# Patient Record
Sex: MALE | Race: WHITE | Employment: FULL TIME | URBAN - METROPOLITAN AREA
[De-identification: names, ages, dates, MRNs, and addresses within clinical notes are randomized per-mention and may not be internally consistent; named-entity substitution may affect disease eponyms.]

---

## 2017-10-11 ENCOUNTER — HOSPITAL ENCOUNTER (EMERGENCY)
Facility: CLINIC | Age: 42
Discharge: HOME | End: 2017-10-11
Attending: EMERGENCY MEDICINE | Admitting: EMERGENCY MEDICINE
Payer: OTHER MISCELLANEOUS

## 2017-10-11 VITALS
OXYGEN SATURATION: 96 % | SYSTOLIC BLOOD PRESSURE: 163 MMHG | TEMPERATURE: 97.8 F | DIASTOLIC BLOOD PRESSURE: 105 MMHG | RESPIRATION RATE: 16 BRPM

## 2017-10-11 DIAGNOSIS — T15.02XA FOREIGN BODY OF LEFT CORNEA, INITIAL ENCOUNTER: ICD-10-CM

## 2017-10-11 PROCEDURE — 99283 EMERGENCY DEPT VISIT LOW MDM: CPT | Performed by: EMERGENCY MEDICINE

## 2017-10-11 PROCEDURE — 25000125 ZZHC RX 250: Performed by: EMERGENCY MEDICINE

## 2017-10-11 PROCEDURE — 99284 EMERGENCY DEPT VISIT MOD MDM: CPT | Mod: Z6 | Performed by: EMERGENCY MEDICINE

## 2017-10-11 RX ORDER — POLYMYXIN B SULFATE AND TRIMETHOPRIM 1; 10000 MG/ML; [USP'U]/ML
1 SOLUTION OPHTHALMIC
Qty: 1 BOTTLE | Refills: 0 | Status: SHIPPED | OUTPATIENT
Start: 2017-10-11 | End: 2017-10-18

## 2017-10-11 RX ORDER — TETRACAINE HYDROCHLORIDE 5 MG/ML
2 SOLUTION OPHTHALMIC ONCE
Status: COMPLETED | OUTPATIENT
Start: 2017-10-11 | End: 2017-10-11

## 2017-10-11 RX ADMIN — TETRACAINE HYDROCHLORIDE 2 DROP: 5 SOLUTION OPHTHALMIC at 21:50

## 2017-10-11 ASSESSMENT — VISUAL ACUITY
OD: 20/15
OS: 20/50

## 2017-10-11 NOTE — ED AVS SNAPSHOT
[facility] Emergency Department    [address] [address]    Phone:  [phone]    Fax:  [phone]                                       Adalberto Ramon   MRN: [id number]    Department:  [facility] Emergency Department   Date of Visit:  10/11/2017           Patient Information     Date Of Birth          1975        Your diagnoses for this visit were:     Foreign body of left cornea, initial encounter        You were seen by Dav Cheek MD.        Discharge Instructions       Return to the emergency department if you have decreased vision, increased pain, or other concerns.  Otherwise follow-up in ophthalmology clinic in 2 days for a recheck.    24 Hour Appointment Hotline       To make an appointment at any [facility], call [phone] ([phone]). If you don't have a family doctor or clinic, we will help you find one. [place] clinics are conveniently located to serve the needs of you and your family.             Review of your medicines      START taking        Dose / Directions Last dose taken    trimethoprim-polymyxin b ophthalmic solution   Commonly known as:  POLYTRIM   Dose:  1 drop   Quantity:  1 Bottle        Apply 1 drop to eye every 3 hours for 7 days   Refills:  0          Our records show that you are taking the medicines listed below. If these are incorrect, please call your family doctor or clinic.        Dose / Directions Last dose taken    ADVIL 200 MG tablet   Dose:  200 mg   Generic drug:  ibuprofen        Take 200 mg by mouth every 4 hours as needed. 2 tablets as needed   Refills:  0        Capsaicin 0.1 % cream   Dose:  1 dose.   Quantity:  30 g        Externally apply 1 dose topically 4 times daily Apply sparingly using rubber gloves.   Refills:  11        cyclobenzaprine 10 MG tablet   Commonly known as:  FLEXERIL   Dose:  10 mg   Quantity:  30 tablet        Take 1 tablet (10 mg) by mouth At Bedtime   Refills:  5        TUMS 500  "MG chewable tablet   Dose:  1 chew tab   Generic drug:  calcium carbonate        Take 1 chew tab by mouth daily.   Refills:  0                Prescriptions were sent or printed at these locations (1 Prescription)                   Other Prescriptions                Printed at Department/Unit printer (1 of 1)         trimethoprim-polymyxin b (POLYTRIM) ophthalmic solution                Procedures and tests performed during your visit     Visual Acuity      Orders Needing Specimen Collection     None      Pending Results     No orders found from 10/9/2017 to 10/12/2017.            Pending Culture Results     No orders found from 10/9/2017 to 10/12/2017.            Pending Results Instructions     If you had any lab results that were not finalized at the time of your Discharge, you can call the ED Lab Result RN at [phone]. You will be contacted by this team for any positive Lab results or changes in treatment. The nurses are available 7 days a week from 10A to 6:30P.  You can leave a message 24 hours per day and they will return your call.        Test Results From Your Hospital Stay               Thank you for choosing [facility]       Thank you for choosing [facility] for your care. Our goal is always to provide you with excellent care. Hearing back from our patients is one way we can continue to improve our services. Please take a few minutes to complete the written survey that you may receive in the mail after you visit with us. Thank you!        [employer] Information     [employer] lets you send messages to your doctor, view your test results, renew your prescriptions, schedule appointments and more. To sign up, go to [email or web address] . Click on \"Log in\" on the left side of the screen, which will take you to the Welcome page. Then click on \"Sign up Now\" on the right side of the page.     You will be asked to enter the access code listed below, as well as some personal information. Please follow the directions " to create your username and password.     Your access code is: [email or web address]  Expires: 2018 10:17 PM     Your access code will  in 90 days. If you need help or a new code, please call your [facility] clinic or [phone].        Care EveryWhere ID     This is your Care EveryWhere ID. This could be used by other organizations to access your [facility] medical records  [id number]        Equal Access to Services     ELMER CLAY : Hadii viv solomon hadasho Soomaali, waaxda luqadaha, qaybta kaalmada adeegyada, dariel thacker . So [facility] [phone].    ATENCIÓN: Si habla español, tiene a mejía disposición servicios gratuitos de asistencia lingüística. Llame al [phone].    We comply with applicable federal civil rights laws and [place] laws. We do not discriminate on the basis of race, color, national origin, age, disability, sex, sexual orientation, or gender identity.            After Visit Summary       This is your record. Keep this with you and show to your community pharmacist(s) and doctor(s) at your next visit.

## 2017-10-11 NOTE — ED AVS SNAPSHOT
[facility] Emergency Department    [address] [address]    Phone:  [phone]    Fax:  [phone]                                       Adalberto Ramon   MRN: [id number]    Department:  [facility] Emergency Department   Date of Visit:  10/11/2017           After Visit Summary Signature Page     I have received my discharge instructions, and my questions have been answered. I have discussed any challenges I see with this plan with the nurse or doctor.    ..........................................................................................................................................  Patient/Patient Representative Signature      ..........................................................................................................................................  Patient Representative Print Name and Relationship to Patient    ..................................................               ................................................  Date                                            Time    ..........................................................................................................................................  Reviewed by Signature/Title    ...................................................              ..............................................  Date                                                            Time

## 2017-10-12 ENCOUNTER — HOSPITAL ENCOUNTER (EMERGENCY)
Facility: CLINIC | Age: 42
Discharge: HOME | End: 2017-10-12
Attending: EMERGENCY MEDICINE | Admitting: EMERGENCY MEDICINE
Payer: OTHER MISCELLANEOUS

## 2017-10-12 VITALS
TEMPERATURE: 98 F | SYSTOLIC BLOOD PRESSURE: 146 MMHG | BODY MASS INDEX: 33.61 KG/M2 | DIASTOLIC BLOOD PRESSURE: 107 MMHG | RESPIRATION RATE: 18 BRPM | OXYGEN SATURATION: 95 % | WEIGHT: 241 LBS

## 2017-10-12 DIAGNOSIS — T15.02XA FOREIGN BODY OF LEFT CORNEA, INITIAL ENCOUNTER: ICD-10-CM

## 2017-10-12 PROCEDURE — 99283 EMERGENCY DEPT VISIT LOW MDM: CPT | Mod: 25 | Performed by: EMERGENCY MEDICINE

## 2017-10-12 PROCEDURE — 65222 REMOVE FOREIGN BODY FROM EYE: CPT | Performed by: EMERGENCY MEDICINE

## 2017-10-12 PROCEDURE — 65222 REMOVE FOREIGN BODY FROM EYE: CPT | Mod: Z6 | Performed by: EMERGENCY MEDICINE

## 2017-10-12 PROCEDURE — 25000132 ZZH RX MED GY IP 250 OP 250 PS 637: Performed by: EMERGENCY MEDICINE

## 2017-10-12 RX ORDER — TETRACAINE HYDROCHLORIDE 5 MG/ML
2 SOLUTION OPHTHALMIC ONCE
Status: DISCONTINUED | OUTPATIENT
Start: 2017-10-12 | End: 2017-10-12 | Stop reason: HOSPADM

## 2017-10-12 RX ORDER — IBUPROFEN 400 MG/1
800 TABLET, FILM COATED ORAL ONCE
Status: COMPLETED | OUTPATIENT
Start: 2017-10-12 | End: 2017-10-12

## 2017-10-12 RX ORDER — IBUPROFEN 200 MG
800 TABLET ORAL EVERY 8 HOURS PRN
Qty: 30 TABLET | Refills: 0 | COMMUNITY
Start: 2017-10-12 | End: 2020-09-19

## 2017-10-12 RX ADMIN — IBUPROFEN 800 MG: 400 TABLET ORAL at 05:42

## 2017-10-12 ASSESSMENT — ENCOUNTER SYMPTOMS
FEVER: 0
NAUSEA: 0
EYE PAIN: 1
FATIGUE: 0
CHEST TIGHTNESS: 0

## 2017-10-12 ASSESSMENT — PAIN DESCRIPTION - DESCRIPTORS: DESCRIPTORS: ACHING

## 2017-10-12 NOTE — ED PROVIDER NOTES
History   Left eye irritation    HPI  Adalberto Ramon is a 42 year old male who presents for foreign body sensation of the left eye.  Symptoms started earlier today when he was working.  He works as a  and was underneath a car when he felt like a piece of rust fell into his eye.  He has had this happen many times before.  No visual changes.  No discharge from the eye.  No fever no headache.    Previously healthy  No daily medications  No known drug allergies  Does not smoke  Tetanus is up-to-date per the patient.    I have reviewed the Medications, Allergies, Past Medical and Surgical History, and Social History in the Epic system.         Review of Systems  A 4 point review of systems was performed. All pertinent positives and negatives were listed in the HPI and rest of ROS were otherwise negative.    Physical Exam   BP: (!) 163/105  Heart Rate: 67  Temp: 97.8  F (36.6  C)  Resp: 16  SpO2: 96 %  Visual Acuity-Left: 20/50  Visual Acuity-Right: 20/15    Physical Exam   Constitutional: He is oriented to person, place, and time. He appears well-developed and well-nourished. No distress.   HENT:   Head: Normocephalic and atraumatic.   Eyes: EOM are normal. Pupils are equal, round, and reactive to light. Lids are everted and swept, no foreign bodies found. Left conjunctiva is injected. No scleral icterus.   Slit lamp exam:       The right eye shows fluorescein uptake.        The left eye shows foreign body. The left eye shows no corneal abrasion, no corneal flare, no hyphema and no hypopyon.   Negative Aurelia sign   Neck: Normal range of motion. Neck supple.   Neurological: He is alert and oriented to person, place, and time.   Skin: Skin is warm and dry. No rash noted. He is not diaphoretic. No erythema. No pallor.       ED Course     ED Course     FB removal  Date/Time: 10/11/2017 10:23 PM  Performed by: JOHN CORTES  Authorized by: JOHN CORTES   Consent: Verbal consent  obtained.  Consent given by: patient  Patient identity confirmed: verbally with patient  Body area: eye  Location details: left cornea    Anesthesia:  Local Anesthetic: tetracaine drops    Sedation:  Patient sedated: no  Patient restrained: no  Patient cooperative: yes  Localization method: slit lamp and visualized  Removal mechanism: eye spud and irrigation  Eye examined with fluorescein.  Fluorescein uptake.  Corneal abrasion size: small  Corneal abrasion location: medial  No residual rust ring present.  Dressing: antibiotic drops  Depth: superficial  Complexity: simple  1 objects recovered.  Post-procedure assessment: foreign body removed  Patient tolerance: Patient tolerated the procedure well with no immediate complications                   Critical Care time:  none               Labs Ordered and Resulted from Time of ED Arrival Up to the Time of Departure from the ED - No data to display    Assessments & Plan (with Medical Decision Making)   41 yo M with left eye irritation. Differential includes conjunctivitis, superficial punctate keratitis, corneal ulcer, herpes, conjunctival abrasion, corneal foreign body. Symptoms improved with tetracaine.  Foreign body seen on exam and removed as above.  No sign of globe rupture.  The eye was irrigated and the patient was discharged with instructions to return if decreased vision or other concerns, otherwise follow up in ophthalmology clinic.  He was discharged with a prescription for Polytrim for antibiotic for plexus.  He does not wear contacts. The patient is in agreement with this plan.    I have reviewed the nursing notes.    I have reviewed the findings, diagnosis, plan and need for follow up with the patient.       New Prescriptions    TRIMETHOPRIM-POLYMYXIN B (POLYTRIM) OPHTHALMIC SOLUTION    Apply 1 drop to eye every 3 hours for 7 days       Final diagnoses:   Foreign body of left cornea, initial encounter       10/11/2017   [facility]  DEPARTMENT     Dav Cheek MD  10/11/17 1410

## 2017-10-12 NOTE — ED AVS SNAPSHOT
[facility] Emergency Department    [address] [address]    Phone:  [phone]    Fax:  [phone]                                       Adalberto Ramon   MRN: [id number]    Department:  [facility] Emergency Department   Date of Visit:  10/12/2017           After Visit Summary Signature Page     I have received my discharge instructions, and my questions have been answered. I have discussed any challenges I see with this plan with the nurse or doctor.    ..........................................................................................................................................  Patient/Patient Representative Signature      ..........................................................................................................................................  Patient Representative Print Name and Relationship to Patient    ..................................................               ................................................  Date                                            Time    ..........................................................................................................................................  Reviewed by Signature/Title    ...................................................              ..............................................  Date                                                            Time

## 2017-10-12 NOTE — DISCHARGE INSTRUCTIONS
"  Particle Removed from Eye (Corneal Foreign Body)    A particle got into your eye and stuck to the cornea (the clear part in the front of the eye). Your healthcare provider has removed this particle. The cornea is very sensitive and may still hurt for another 1 to 2 days while it heals.  If a metal particle was in your eye, a \"rust ring\" may have formed. This may require a second visit for complete removal.  Your healthcare provider may have put an eye patch on your eye. This may help the healing process. But you also may not receive an eye patch. For several types of injuries to the cornea, they are not recommended.  Home care    You may wear sunglasses to help reduce symptoms while the eye is healing, unless advised otherwise by your healthcare provider.    You may use acetaminophen or ibuprofen to control pain, unless another pain medicine was prescribed. (Note: If you have chronic liver or kidney disease, or if you have ever had a stomach ulcer or gastrointestinal bleeding, talk with your healthcare provider before using these medicines.)    If you received an eye patch:    It should not be left in place for more than 24 hours, unless advised otherwise by your healthcare provider.    Always keep your return appointment for patch removal and re-exam. Your eye could be harmed if the patch remains in place longer than advised.    Do not drive a motor vehicle or operate machinery with the patch in place. You will have trouble judging distances with only one eye.    If eye drops or ointment were prescribed, use as directed.  Follow-up care    No eye patch: If no patch was used, but the pain continues for more than 48 hours, you should have another eye exam.    Eye patch: If your eye was patched and you were given a return appointment for patch removal and re-exam, do not miss the visit. Again, it could be harmful to your eye if the patch remains in place longer than advised. However, if you were asked to remove the " eye patch within 24 hours (or as directed by your healthcare provider), contact your healthcare provider right away if your pain increases or get worse after removal of the eye patch.  When to seek medical advice  Call your healthcare provider right away if any of these occur:    Increasing eye pain or pain that does not improve after 24 hours    Discharge from the eye    Redness of the eye or swelling of the eyelids    Worsening vision  Date Last Reviewed: 2/24/2017 2000-2017 [employer]. [address] [place] [address]. All rights reserved. This information is not intended as a substitute for professional medical care. Always follow your healthcare professional's instructions.

## 2017-10-12 NOTE — DISCHARGE INSTRUCTIONS
Return to the emergency department if you have decreased vision, increased pain, or other concerns.  Otherwise follow-up in ophthalmology clinic in 2 days for a recheck.

## 2017-10-12 NOTE — ED AVS SNAPSHOT
" [facility] Emergency Department    [address] [address]    Phone:  [phone]    Fax:  [phone]                                       Adalberto Ramon   MRN: [id number]    Department:  [facility] Emergency Department   Date of Visit:  10/12/2017           Patient Information     Date Of Birth          1975        Your diagnoses for this visit were:     Foreign body of left cornea, initial encounter Removed       You were seen by Jose R Yun MD.      Follow-up Information     Schedule an appointment as soon as possible for a visit with [facility].    Why:  As needed if eye problems continue    Contact information:    [address] [facility] [address] [phone]        Discharge Instructions         Particle Removed from Eye (Corneal Foreign Body)    A particle got into your eye and stuck to the cornea (the clear part in the front of the eye). Your healthcare provider has removed this particle. The cornea is very sensitive and may still hurt for another 1 to 2 days while it heals.  If a metal particle was in your eye, a \"rust ring\" may have formed. This may require a second visit for complete removal.  Your healthcare provider may have put an eye patch on your eye. This may help the healing process. But you also may not receive an eye patch. For several types of injuries to the cornea, they are not recommended.  Home care    You may wear sunglasses to help reduce symptoms while the eye is healing, unless advised otherwise by your healthcare provider.    You may use acetaminophen or ibuprofen to control pain, unless another pain medicine was prescribed. (Note: If you have chronic liver or kidney disease, or if you have ever had a stomach ulcer or gastrointestinal bleeding, talk with your healthcare provider before using these medicines.)    If you received an eye patch:    It should not be left in place for more than 24 hours, unless advised " otherwise by your healthcare provider.    Always keep your return appointment for patch removal and re-exam. Your eye could be harmed if the patch remains in place longer than advised.    Do not drive a motor vehicle or operate machinery with the patch in place. You will have trouble judging distances with only one eye.    If eye drops or ointment were prescribed, use as directed.  Follow-up care    No eye patch: If no patch was used, but the pain continues for more than 48 hours, you should have another eye exam.    Eye patch: If your eye was patched and you were given a return appointment for patch removal and re-exam, do not miss the visit. Again, it could be harmful to your eye if the patch remains in place longer than advised. However, if you were asked to remove the eye patch within 24 hours (or as directed by your healthcare provider), contact your healthcare provider right away if your pain increases or get worse after removal of the eye patch.  When to seek medical advice  Call your healthcare provider right away if any of these occur:    Increasing eye pain or pain that does not improve after 24 hours    Discharge from the eye    Redness of the eye or swelling of the eyelids    Worsening vision  Date Last Reviewed: 2/24/2017 2000-2017 [employer]. [address] [place] [address]. All rights reserved. This information is not intended as a substitute for professional medical care. Always follow your healthcare professional's instructions.          24 Hour Appointment Hotline       To make an appointment at any [facility], call [phone] ([phone]). If you don't have a family doctor or clinic, we will help you find one. [facility] are conveniently located to serve the needs of you and your family.             Review of your medicines      CONTINUE these medicines which may have CHANGED, or have new prescriptions. If we are uncertain of the size of  tablets/capsules you have at home, strength may be listed as something that might have changed.        Dose / Directions Last dose taken    ibuprofen 200 MG tablet   Commonly known as:  ADVIL/MOTRIN   Dose:  800 mg   What changed:    - how much to take  - when to take this  - reasons to take this  - additional instructions   Quantity:  30 tablet        Take 4 tablets (800 mg) by mouth every 8 hours as needed for mild pain   Refills:  0          Our records show that you are taking the medicines listed below. If these are incorrect, please call your family doctor or clinic.        Dose / Directions Last dose taken    Capsaicin 0.1 % cream   Dose:  1 dose.   Quantity:  30 g        Externally apply 1 dose topically 4 times daily Apply sparingly using rubber gloves.   Refills:  11        cyclobenzaprine 10 MG tablet   Commonly known as:  FLEXERIL   Dose:  10 mg   Quantity:  30 tablet        Take 1 tablet (10 mg) by mouth At Bedtime   Refills:  5        trimethoprim-polymyxin b ophthalmic solution   Commonly known as:  POLYTRIM   Dose:  1 drop   Quantity:  1 Bottle        Apply 1 drop to eye every 3 hours for 7 days   Refills:  0        TUMS 500 MG chewable tablet   Dose:  1 chew tab   Generic drug:  calcium carbonate        Take 1 chew tab by mouth daily.   Refills:  0                Prescriptions were sent or printed at these locations (1 Prescription)                   Other Prescriptions                Not Printed or Sent (1 of 1)         ibuprofen (ADVIL/MOTRIN) 200 MG tablet                Orders Needing Specimen Collection     None      Pending Results     No orders found from 10/10/2017 to 10/13/2017.            Pending Culture Results     No orders found from 10/10/2017 to 10/13/2017.            Pending Results Instructions     If you had any lab results that were not finalized at the time of your Discharge, you can call the ED Lab Result RN at [phone]. You will be contacted by this team for any positive  "Lab results or changes in treatment. The nurses are available 7 days a week from 10A to 6:30P.  You can leave a message 24 hours per day and they will return your call.        Test Results From Your Hospital Stay               Thank you for choosing [facility]       Thank you for choosing [facility] for your care. Our goal is always to provide you with excellent care. Hearing back from our patients is one way we can continue to improve our services. Please take a few minutes to complete the written survey that you may receive in the mail after you visit with us. Thank you!        [employer] Information     [employer] lets you send messages to your doctor, view your test results, renew your prescriptions, schedule appointments and more. To sign up, go to [email or web address] . Click on \"Log in\" on the left side of the screen, which will take you to the Welcome page. Then click on \"Sign up Now\" on the right side of the page.     You will be asked to enter the access code listed below, as well as some personal information. Please follow the directions to create your username and password.     Your access code is: [email or web address]  Expires: 2018 10:17 PM     Your access code will  in 90 days. If you need help or a new code, please call your [facility] clinic or [phone].        Care EveryWhere ID     This is your Care EveryWhere ID. This could be used by other organizations to access your [facility] medical records  [id number]        Equal Access to Services     ELMER CLAY AH: Hadii viv anayao Soarden, waaxda luqadaha, qaybta kaalmada marquiseyada, dariel ospina. So [facility] [phone].    ATENCIÓN: Si habla español, tiene a mejía disposición servicios gratuitos de asistencia lingüística. Llame al [phone].    We comply with applicable federal civil rights laws and [place] laws. We do not discriminate on the basis of race, color, national origin, age, disability, sex, sexual orientation, " or gender identity.            After Visit Summary       This is your record. Keep this with you and show to your community pharmacist(s) and doctor(s) at your next visit.

## 2017-10-12 NOTE — ED PROVIDER NOTES
"  History     Chief Complaint   Patient presents with     Eye Pain     left     The history is provided by the patient.     Adalberto Ramon is a 42 year old male who was seen yesterday for foreign body in his eye presents for evaluation of ongoing severe pain in his eye and foreign body sensation.  Patient's eye was irrigated yesterday and possible foreign body removed.  The patient was feeling better but again developed foreign body sensation and pain in his eye prompting repeat evaluation tonight.    I have reviewed the Medications, Allergies, Past Medical and Surgical History, and Social History in the Epic system.    Allergies: No Known Allergies      No current facility-administered medications on file prior to encounter.   Current Outpatient Prescriptions on File Prior to Encounter:  trimethoprim-polymyxin b (POLYTRIM) ophthalmic solution Apply 1 drop to eye every 3 hours for 7 days   cyclobenzaprine (FLEXERIL) 10 MG tablet Take 1 tablet (10 mg) by mouth At Bedtime   Capsaicin 0.1 % CREA Externally apply 1 dose topically 4 times daily Apply sparingly using rubber gloves.   calcium carbonate (TUMS) 500 MG chewable tablet Take 1 chew tab by mouth daily.       Patient Active Problem List   Diagnosis     Family history of prostate cancer     Esophageal reflux     Snoring     Family history of bladder cancer     CARDIOVASCULAR SCREENING; LDL GOAL LESS THAN 160       No past surgical history on file.    Social History   Substance Use Topics     Smoking status: Never Smoker     Smokeless tobacco: Never Used     Alcohol use Yes      Comment: rare- once or twice a year       Most Recent Immunizations   Administered Date(s) Administered     TDAP Vaccine (Adacel) 06/26/2009       BMI: Estimated body mass index is 33.61 kg/(m^2) as calculated from the following:    Height as of 11/15/16: 1.803 m (5' 11\").    Weight as of this encounter: 109.3 kg (241 lb).      Review of Systems   Constitutional: Negative for fatigue and " fever.   Eyes: Positive for pain, redness and visual disturbance ( watering eye).   Respiratory: Negative for chest tightness.    Cardiovascular: Negative for chest pain.   Gastrointestinal: Negative for nausea.   Neurological: Negative for headaches.   All other systems reviewed and are negative.      Physical Exam   BP: (!) 146/107  Heart Rate: 64  Temp: 98  F (36.7  C)  Resp: 18  Weight: 109.3 kg (241 lb)  SpO2: 95 %       Physical Exam   Constitutional: He is oriented to person, place, and time. He appears well-developed and well-nourished. No distress.   HENT:   Head: Normocephalic and atraumatic.   Eyes: Conjunctivae and EOM are normal. Pupils are equal, round, and reactive to light. Foreign body present in the left eye.   Slit lamp exam:       The left eye shows corneal abrasion, foreign body and fluorescein uptake.       Pulmonary/Chest: Effort normal.   Neurological: He is alert and oriented to person, place, and time.   Skin: Skin is warm and dry.   Psychiatric: He has a normal mood and affect.   Nursing note and vitals reviewed.      ED Course     ED Course     FB removal  Date/Time: 10/13/2017 5:22 AM  Performed by: SHIRA TORRES  Authorized by: SHIRA TORRES   Consent: Verbal consent obtained.  Risks and benefits: risks, benefits and alternatives were discussed  Consent given by: patient  Body area: eye  Location details: left cornea    Anesthesia:  Local Anesthetic: tetracaine drops    Sedation:  Patient sedated: no  Patient restrained: no  Patient cooperative: yes  Localization method: slit lamp and visualized  Removal mechanism: irrigation (18 g needle)  Eye examined with fluorescein.  Fluorescein uptake.  Corneal abrasion size: small  Corneal abrasion location: medial  No residual rust ring present.  Dressing: antibiotic drops  Depth: superficial  Complexity: simple  1 objects recovered.  Objects recovered: 1  Post-procedure assessment: foreign body removed  Patient tolerance:  Patient tolerated the procedure well with no immediate complications                       Labs Ordered and Resulted from Time of ED Arrival Up to the Time of Departure from the ED - No data to display    Assessments & Plan (with Medical Decision Making)  42-year-old male.  For evaluation of foreign body sensation in his left eye.  Patient seen earlier for the same and had some debris removed but patient had return of symptoms.  Repeat evaluation tonight identified an embedded corneal foreign body at the 9 o'clock position of his left eye.  Foreign body was removed successfully.  Patient advised to continue with antibiotic drops and follow-up with the eye clinic if symptoms continue      I have reviewed the nursing notes.    I have reviewed the findings, diagnosis, plan and need for follow up with the patient.       Discharge Medication List as of 10/12/2017  5:43 AM          Final diagnoses:   Foreign body of left cornea, initial encounter - Removed       10/12/2017   [facility] EMERGENCY DEPARTMENT     Yun, Jose R Lua MD  10/13/17 0571

## 2017-10-12 NOTE — ED NOTES
PT presents to the ED with C/O pain to the right eye. States he was seen last night for the same. States the pain is not getting better. PT is A&OX4, appears to be in NAD with no SOB noted at this time. All needs are being met and all comfort measures are being addressed. Awaiting MD moe and orders at this time.

## 2017-10-12 NOTE — ED NOTES
Pt was working underneath his truck with a yoan oilpan this afternoon around 1430 and believes that something got into his eye. The pain has worsened over time. Pt was wearing safety glasses. Pt feels like something is stuck in the inner corner of his left eye by his nose. Tetracaine drops administered in triage.

## 2017-10-13 ASSESSMENT — ENCOUNTER SYMPTOMS
HEADACHES: 0
EYE REDNESS: 1

## 2020-03-25 ENCOUNTER — HOSPITAL ENCOUNTER (EMERGENCY)
Facility: CLINIC | Age: 45
Discharge: HOME | End: 2020-03-25
Attending: NURSE PRACTITIONER | Admitting: NURSE PRACTITIONER
Payer: OTHER MISCELLANEOUS

## 2020-03-25 VITALS
HEART RATE: 75 BPM | BODY MASS INDEX: 32.9 KG/M2 | RESPIRATION RATE: 16 BRPM | TEMPERATURE: 98.4 F | DIASTOLIC BLOOD PRESSURE: 112 MMHG | SYSTOLIC BLOOD PRESSURE: 155 MMHG | WEIGHT: 235 LBS | HEIGHT: 71 IN

## 2020-03-25 DIAGNOSIS — S05.01XA ABRASION OF RIGHT CORNEA, INITIAL ENCOUNTER: ICD-10-CM

## 2020-03-25 PROCEDURE — 99284 EMERGENCY DEPT VISIT MOD MDM: CPT | Mod: Z6 | Performed by: NURSE PRACTITIONER

## 2020-03-25 PROCEDURE — 99283 EMERGENCY DEPT VISIT LOW MDM: CPT | Performed by: NURSE PRACTITIONER

## 2020-03-25 RX ORDER — GENTAMICIN SULFATE 3 MG/ML
2 SOLUTION/ DROPS OPHTHALMIC EVERY 4 HOURS
Qty: 5 ML | Refills: 0 | Status: SHIPPED | OUTPATIENT
Start: 2020-03-25 | End: 2020-04-01

## 2020-03-25 RX ORDER — TETRACAINE HYDROCHLORIDE 5 MG/ML
2 SOLUTION OPHTHALMIC ONCE
Status: DISCONTINUED | OUTPATIENT
Start: 2020-03-25 | End: 2020-03-25 | Stop reason: HOSPADM

## 2020-03-25 ASSESSMENT — VISUAL ACUITY: OU: 1

## 2020-03-25 ASSESSMENT — ENCOUNTER SYMPTOMS
CONSTITUTIONAL NEGATIVE: 1
EYE PAIN: 1
PHOTOPHOBIA: 0
RESPIRATORY NEGATIVE: 1
EYE DISCHARGE: 0

## 2020-03-25 ASSESSMENT — MIFFLIN-ST. JEOR: SCORE: 1973.08

## 2020-03-25 NOTE — ED AVS SNAPSHOT
[facility] Emergency Department  [address] [address]  Phone:  [phone]  Fax:  [phone]                                    Adalberto Ramon   MRN: [id number]    Department:  [facility] Emergency Department   Date of Visit:  3/25/2020           After Visit Summary Signature Page    I have received my discharge instructions, and my questions have been answered. I have discussed any challenges I see with this plan with the nurse or doctor.    ..........................................................................................................................................  Patient/Patient Representative Signature      ..........................................................................................................................................  Patient Representative Print Name and Relationship to Patient    ..................................................               ................................................  Date                                   Time    ..........................................................................................................................................  Reviewed by Signature/Title    ...................................................              ..............................................  Date                                               Time          22EPIC Rev 08/18

## 2020-03-25 NOTE — ED PROVIDER NOTES
History     Chief Complaint   Patient presents with     Eye Problem     got something in his eye today but was able to get it out. still feels irritated     HPI  Adalberto Ramon is a 44 year old male who presents to the emergency department for evaluation of foreign body in his left eye.  This occurred at work today, and he thought it might be a piece of metal.  Patient's coworker could see a black dot of something and was able to remove it. Pt reports foreign body sensation resolved after this but continues to feel some irritation and right eye watering.  Tetanus is UTD per patient.    Allergies:  No Known Allergies    Problem List:    Patient Active Problem List    Diagnosis Date Noted     CARDIOVASCULAR SCREENING; LDL GOAL LESS THAN 160 11/20/2012     Priority: Medium     Family history of prostate cancer 11/13/2012     Priority: Medium     Father       Esophageal reflux 11/13/2012     Priority: Medium     Snoring 11/13/2012     Priority: Medium     Family history of bladder cancer 11/13/2012     Priority: Medium        Past Medical History:    No past medical history on file.    Past Surgical History:    No past surgical history on file.    Family History:    Family History   Problem Relation Age of Onset     Cancer Father         bladder cancer       Social History:  Marital Status:   [2]  Social History     Tobacco Use     Smoking status: Never Smoker     Smokeless tobacco: Never Used   Substance Use Topics     Alcohol use: Yes     Comment: rare- once or twice a year     Drug use: No        Medications:    gentamicin (GARAMYCIN) 0.3 % ophthalmic solution  calcium carbonate (TUMS) 500 MG chewable tablet  Capsaicin 0.1 % CREA  cyclobenzaprine (FLEXERIL) 10 MG tablet  ibuprofen (ADVIL/MOTRIN) 200 MG tablet          Review of Systems   Constitutional: Negative.    HENT: Negative.    Eyes: Positive for pain. Negative for photophobia and discharge.   Respiratory: Negative.        Physical Exam   BP: (!)  "155/112  Pulse: 75  Temp: 98.4  F (36.9  C)  Resp: 16  Height: 180.3 cm (5' 11\")  Weight: 106.6 kg (235 lb)(stated)     pt is 20/20 using both eyes.  Physical Exam  Constitutional:       General: He is not in acute distress.     Appearance: Normal appearance. He is not ill-appearing.   HENT:      Head: Normocephalic and atraumatic.      Mouth/Throat:      Mouth: Mucous membranes are moist.   Eyes:      General: Lids are normal. Lids are everted, no foreign bodies appreciated. Vision grossly intact.         Right eye: No foreign body.      Extraocular Movements: Extraocular movements intact.      Conjunctiva/sclera:      Right eye: Right conjunctiva is injected. No chemosis, exudate or hemorrhage.     Pupils:      Right eye: Corneal abrasion and fluorescein uptake present.        Comments: No evidence of foreign body.   Cardiovascular:      Rate and Rhythm: Normal rate.   Pulmonary:      Effort: Pulmonary effort is normal.   Neurological:      Mental Status: He is alert.         ED Course        Procedures         No results found for this or any previous visit (from the past 24 hour(s)).    Medications   tetracaine (PONTOCAINE) 0.5 % ophthalmic solution 2 drop (has no administration in time range)       Assessments & Plan (with Medical Decision Making)   No evidence of residual foreign body to his right eye.  There is forcing uptake, with evidence of a corneal abrasion at 9-o'clock.  Patient will be started on gentamicin for empiric antibiotics.  Tetanus is up-to-date.  Worrisome reasons to recheck discussed.  I have reviewed the nursing notes.    I have reviewed the findings, diagnosis, plan and need for follow up with the patient.    Discharge Medication List as of 3/25/2020  6:10 PM      START taking these medications    Details   gentamicin (GARAMYCIN) 0.3 % ophthalmic solution Place 2 drops into the right eye every 4 hours for 7 days, Disp-5 mL,R-0, E-Prescribe             Final diagnoses:   Abrasion of right " cornea, initial encounter       3/25/2020   [facility] EMERGENCY DEPARTMENT     Amina, Amada Patel, FLAVIO CNP  03/25/20 1913

## 2020-03-25 NOTE — DISCHARGE INSTRUCTIONS
Gentamycin 0.3% eye drop:  2 drops to right eye every 4 hours for 7 days.  Recheck with [facility] [phone], or the emergency department for worsening or failure to improve in 3 days.

## 2020-09-19 ENCOUNTER — APPOINTMENT (OUTPATIENT)
Dept: CT IMAGING | Facility: CLINIC | Age: 45
End: 2020-09-19
Attending: EMERGENCY MEDICINE
Payer: COMMERCIAL

## 2020-09-19 ENCOUNTER — HOSPITAL ENCOUNTER (EMERGENCY)
Facility: CLINIC | Age: 45
Discharge: HOME | End: 2020-09-19
Attending: EMERGENCY MEDICINE | Admitting: EMERGENCY MEDICINE
Payer: COMMERCIAL

## 2020-09-19 VITALS
TEMPERATURE: 98.1 F | SYSTOLIC BLOOD PRESSURE: 161 MMHG | RESPIRATION RATE: 18 BRPM | HEIGHT: 70 IN | BODY MASS INDEX: 35.07 KG/M2 | DIASTOLIC BLOOD PRESSURE: 93 MMHG | WEIGHT: 245 LBS | HEART RATE: 59 BPM | OXYGEN SATURATION: 96 %

## 2020-09-19 DIAGNOSIS — N20.1 URETEROLITHIASIS: ICD-10-CM

## 2020-09-19 LAB
ALBUMIN UR-MCNC: NEGATIVE MG/DL
ANION GAP SERPL CALCULATED.3IONS-SCNC: 8 MMOL/L (ref 3–14)
APPEARANCE UR: CLEAR
BASOPHILS # BLD AUTO: 0.1 10E9/L (ref 0–0.2)
BASOPHILS NFR BLD AUTO: 0.6 %
BILIRUB UR QL STRIP: NEGATIVE
BUN SERPL-MCNC: 15 MG/DL (ref 7–30)
CALCIUM SERPL-MCNC: 8.9 MG/DL (ref 8.5–10.1)
CHLORIDE SERPL-SCNC: 104 MMOL/L (ref 94–109)
CO2 SERPL-SCNC: 26 MMOL/L (ref 20–32)
COLOR UR AUTO: ABNORMAL
CREAT SERPL-MCNC: 0.99 MG/DL (ref 0.66–1.25)
DIFFERENTIAL METHOD BLD: ABNORMAL
EOSINOPHIL # BLD AUTO: 0.3 10E9/L (ref 0–0.7)
EOSINOPHIL NFR BLD AUTO: 2.3 %
ERYTHROCYTE [DISTWIDTH] IN BLOOD BY AUTOMATED COUNT: 12.3 % (ref 10–15)
GFR SERPL CREATININE-BSD FRML MDRD: >90 ML/MIN/{1.73_M2}
GLUCOSE SERPL-MCNC: 173 MG/DL (ref 70–99)
GLUCOSE UR STRIP-MCNC: 150 MG/DL
HCT VFR BLD AUTO: 48.7 % (ref 40–53)
HGB BLD-MCNC: 16.7 G/DL (ref 13.3–17.7)
HGB UR QL STRIP: ABNORMAL
IMM GRANULOCYTES # BLD: 0.1 10E9/L (ref 0–0.4)
IMM GRANULOCYTES NFR BLD: 0.4 %
KETONES UR STRIP-MCNC: NEGATIVE MG/DL
LEUKOCYTE ESTERASE UR QL STRIP: NEGATIVE
LYMPHOCYTES # BLD AUTO: 2.4 10E9/L (ref 0.8–5.3)
LYMPHOCYTES NFR BLD AUTO: 19.8 %
MCH RBC QN AUTO: 29 PG (ref 26.5–33)
MCHC RBC AUTO-ENTMCNC: 34.3 G/DL (ref 31.5–36.5)
MCV RBC AUTO: 85 FL (ref 78–100)
MONOCYTES # BLD AUTO: 0.7 10E9/L (ref 0–1.3)
MONOCYTES NFR BLD AUTO: 5.7 %
MUCOUS THREADS #/AREA URNS LPF: PRESENT /LPF
NEUTROPHILS # BLD AUTO: 8.5 10E9/L (ref 1.6–8.3)
NEUTROPHILS NFR BLD AUTO: 71.2 %
NITRATE UR QL: NEGATIVE
NRBC # BLD AUTO: 0 10*3/UL
NRBC BLD AUTO-RTO: 0 /100
PH UR STRIP: 8 PH (ref 5–7)
PLATELET # BLD AUTO: 293 10E9/L (ref 150–450)
POTASSIUM SERPL-SCNC: 3.5 MMOL/L (ref 3.4–5.3)
RBC # BLD AUTO: 5.75 10E12/L (ref 4.4–5.9)
RBC #/AREA URNS AUTO: 4 /HPF (ref 0–2)
SODIUM SERPL-SCNC: 138 MMOL/L (ref 133–144)
SOURCE: ABNORMAL
SP GR UR STRIP: 1.01 (ref 1–1.03)
UROBILINOGEN UR STRIP-MCNC: 0 MG/DL (ref 0–2)
WBC # BLD AUTO: 12 10E9/L (ref 4–11)
WBC #/AREA URNS AUTO: 1 /HPF (ref 0–5)

## 2020-09-19 PROCEDURE — 25000132 ZZH RX MED GY IP 250 OP 250 PS 637: Performed by: EMERGENCY MEDICINE

## 2020-09-19 PROCEDURE — 96374 THER/PROPH/DIAG INJ IV PUSH: CPT | Performed by: EMERGENCY MEDICINE

## 2020-09-19 PROCEDURE — 96361 HYDRATE IV INFUSION ADD-ON: CPT | Performed by: EMERGENCY MEDICINE

## 2020-09-19 PROCEDURE — 99285 EMERGENCY DEPT VISIT HI MDM: CPT | Mod: 25 | Performed by: EMERGENCY MEDICINE

## 2020-09-19 PROCEDURE — 25000128 H RX IP 250 OP 636: Performed by: EMERGENCY MEDICINE

## 2020-09-19 PROCEDURE — 85025 COMPLETE CBC W/AUTO DIFF WBC: CPT | Performed by: EMERGENCY MEDICINE

## 2020-09-19 PROCEDURE — 99285 EMERGENCY DEPT VISIT HI MDM: CPT | Mod: Z6 | Performed by: EMERGENCY MEDICINE

## 2020-09-19 PROCEDURE — 80048 BASIC METABOLIC PNL TOTAL CA: CPT | Performed by: EMERGENCY MEDICINE

## 2020-09-19 PROCEDURE — 25800030 ZZH RX IP 258 OP 636: Performed by: EMERGENCY MEDICINE

## 2020-09-19 PROCEDURE — 74176 CT ABD & PELVIS W/O CONTRAST: CPT

## 2020-09-19 PROCEDURE — 81001 URINALYSIS AUTO W/SCOPE: CPT | Performed by: EMERGENCY MEDICINE

## 2020-09-19 PROCEDURE — 96375 TX/PRO/DX INJ NEW DRUG ADDON: CPT | Performed by: EMERGENCY MEDICINE

## 2020-09-19 RX ORDER — TAMSULOSIN HYDROCHLORIDE 0.4 MG/1
0.4 CAPSULE ORAL DAILY
Qty: 10 CAPSULE | Refills: 0 | Status: SHIPPED | OUTPATIENT
Start: 2020-09-19 | End: 2020-09-29

## 2020-09-19 RX ORDER — KETOROLAC TROMETHAMINE 15 MG/ML
15 INJECTION, SOLUTION INTRAMUSCULAR; INTRAVENOUS ONCE
Status: COMPLETED | OUTPATIENT
Start: 2020-09-19 | End: 2020-09-19

## 2020-09-19 RX ORDER — MORPHINE SULFATE 15 MG/1
7.5-15 TABLET ORAL EVERY 4 HOURS PRN
Qty: 6 TABLET | Refills: 0 | Status: SHIPPED | OUTPATIENT
Start: 2020-09-19 | End: 2020-10-15

## 2020-09-19 RX ORDER — IBUPROFEN 200 MG
800 TABLET ORAL EVERY 8 HOURS PRN
Qty: 30 TABLET | Refills: 0 | COMMUNITY
Start: 2020-09-19 | End: 2020-09-24

## 2020-09-19 RX ORDER — HYDROMORPHONE HYDROCHLORIDE 1 MG/ML
0.5 INJECTION, SOLUTION INTRAMUSCULAR; INTRAVENOUS; SUBCUTANEOUS
Status: DISCONTINUED | OUTPATIENT
Start: 2020-09-19 | End: 2020-09-19 | Stop reason: HOSPADM

## 2020-09-19 RX ORDER — TAMSULOSIN HYDROCHLORIDE 0.4 MG/1
0.4 CAPSULE ORAL ONCE
Status: COMPLETED | OUTPATIENT
Start: 2020-09-19 | End: 2020-09-19

## 2020-09-19 RX ORDER — ACETAMINOPHEN 500 MG
1000 TABLET ORAL EVERY 8 HOURS PRN
Qty: 30 TABLET | Refills: 0 | COMMUNITY
Start: 2020-09-19 | End: 2020-09-24

## 2020-09-19 RX ORDER — ONDANSETRON 2 MG/ML
4 INJECTION INTRAMUSCULAR; INTRAVENOUS EVERY 30 MIN PRN
Status: DISCONTINUED | OUTPATIENT
Start: 2020-09-19 | End: 2020-09-19 | Stop reason: HOSPADM

## 2020-09-19 RX ADMIN — ONDANSETRON 4 MG: 2 INJECTION INTRAMUSCULAR; INTRAVENOUS at 04:49

## 2020-09-19 RX ADMIN — KETOROLAC TROMETHAMINE 15 MG: 15 INJECTION, SOLUTION INTRAMUSCULAR; INTRAVENOUS at 04:51

## 2020-09-19 RX ADMIN — HYDROMORPHONE HYDROCHLORIDE 0.5 MG: 1 INJECTION, SOLUTION INTRAMUSCULAR; INTRAVENOUS; SUBCUTANEOUS at 05:36

## 2020-09-19 RX ADMIN — TAMSULOSIN HYDROCHLORIDE 0.4 MG: 0.4 CAPSULE ORAL at 05:34

## 2020-09-19 RX ADMIN — SODIUM CHLORIDE 1000 ML: 9 INJECTION, SOLUTION INTRAVENOUS at 04:48

## 2020-09-19 ASSESSMENT — ENCOUNTER SYMPTOMS
APPETITE CHANGE: 0
LIGHT-HEADEDNESS: 0
FLANK PAIN: 1
HEADACHES: 0
SHORTNESS OF BREATH: 0
ABDOMINAL PAIN: 0
FEVER: 0
NAUSEA: 1
CHILLS: 0
CHEST TIGHTNESS: 0
FATIGUE: 0
HEMATURIA: 0
VOMITING: 1
BACK PAIN: 1
DYSURIA: 0
WOUND: 0

## 2020-09-19 ASSESSMENT — MIFFLIN-ST. JEOR: SCORE: 1997.56

## 2020-09-19 NOTE — ED PROVIDER NOTES
History     Chief Complaint   Patient presents with     Back Pain     HPI  Adalberto Ramon is a 45 year old male with no significant contributing past medical history presenting for evaluation of right-sided back pain.  No reported trauma.  Symptoms have been waxing waning over the past few days but became severely worse tonight before he ED arrival.  Tonight patient reports excruciating sharp pain in his low back, more specifically on the left side of his back.  Denies any dysuria, urgency, frequency.  Denies injury.  Denies fever chills.  Reports that he is always bending and twisting and always as he is a  but does not recall any specific injury denies previous similar pains in the past.  No history of kidney stones.  Tonight when pain became severe he was having associated severe nausea and vomited.  He tried to take some ibuprofen but immediately threw it up.    Allergies:  No Known Allergies    Problem List:    Patient Active Problem List    Diagnosis Date Noted     CARDIOVASCULAR SCREENING; LDL GOAL LESS THAN 160 11/20/2012     Priority: Medium     Family history of prostate cancer 11/13/2012     Priority: Medium     Father       Esophageal reflux 11/13/2012     Priority: Medium     Snoring 11/13/2012     Priority: Medium     Family history of bladder cancer 11/13/2012     Priority: Medium        Past Medical History:    No past medical history on file.    Past Surgical History:    No past surgical history on file.    Family History:    Family History   Problem Relation Age of Onset     Cancer Father         bladder cancer       Social History:  Marital Status:   [2]  Social History     Tobacco Use     Smoking status: Never Smoker     Smokeless tobacco: Never Used   Substance Use Topics     Alcohol use: Yes     Comment: rare- once or twice a year     Drug use: No        Medications:    acetaminophen (TYLENOL) 500 MG tablet  ibuprofen (ADVIL/MOTRIN) 200 MG tablet  morphine (MSIR) 15 MG IR  "tablet  tamsulosin (FLOMAX) 0.4 MG capsule  calcium carbonate (TUMS) 500 MG chewable tablet  Capsaicin 0.1 % CREA  cyclobenzaprine (FLEXERIL) 10 MG tablet          Review of Systems   Constitutional: Negative for appetite change, chills, fatigue and fever.   HENT: Negative for congestion.    Respiratory: Negative for chest tightness and shortness of breath.    Cardiovascular: Negative for chest pain.   Gastrointestinal: Positive for nausea and vomiting. Negative for abdominal pain.   Genitourinary: Positive for flank pain (Left-sided). Negative for decreased urine volume, dysuria and hematuria.   Musculoskeletal: Positive for back pain.   Skin: Negative for rash and wound.   Neurological: Negative for light-headedness and headaches.   All other systems reviewed and are negative.      Physical Exam   BP: (!) 169/98  Pulse: 56  Temp: 98.1  F (36.7  C)  Resp: 18  Height: 177.8 cm (5' 10\")  Weight: 111.1 kg (245 lb)  SpO2: (!) 88 %      Physical Exam  Vitals signs and nursing note reviewed.   HENT:      Head: Normocephalic and atraumatic.      Nose: Nose normal.      Mouth/Throat:      Mouth: Mucous membranes are moist.   Eyes:      Conjunctiva/sclera: Conjunctivae normal.   Cardiovascular:      Rate and Rhythm: Normal rate.      Pulses: Normal pulses.   Pulmonary:      Effort: Pulmonary effort is normal.   Abdominal:      Palpations: Abdomen is soft.      Tenderness: There is no abdominal tenderness. There is left CVA tenderness. There is no guarding or rebound.   Skin:     General: Skin is warm and dry.      Capillary Refill: Capillary refill takes less than 2 seconds.   Neurological:      Mental Status: He is alert and oriented to person, place, and time.   Psychiatric:         Mood and Affect: Mood normal.         ED Course        Procedures                   Results for orders placed or performed during the hospital encounter of 09/19/20 (from the past 24 hour(s))   CBC with platelets, differential   Result Value " Ref Range    WBC 12.0 (H) 4.0 - 11.0 10e9/L    RBC Count 5.75 4.4 - 5.9 10e12/L    Hemoglobin 16.7 13.3 - 17.7 g/dL    Hematocrit 48.7 40.0 - 53.0 %    MCV 85 78 - 100 fl    MCH 29.0 26.5 - 33.0 pg    MCHC 34.3 31.5 - 36.5 g/dL    RDW 12.3 10.0 - 15.0 %    Platelet Count 293 150 - 450 10e9/L    Diff Method Automated Method     % Neutrophils 71.2 %    % Lymphocytes 19.8 %    % Monocytes 5.7 %    % Eosinophils 2.3 %    % Basophils 0.6 %    % Immature Granulocytes 0.4 %    Nucleated RBCs 0 0 /100    Absolute Neutrophil 8.5 (H) 1.6 - 8.3 10e9/L    Absolute Lymphocytes 2.4 0.8 - 5.3 10e9/L    Absolute Monocytes 0.7 0.0 - 1.3 10e9/L    Absolute Eosinophils 0.3 0.0 - 0.7 10e9/L    Absolute Basophils 0.1 0.0 - 0.2 10e9/L    Abs Immature Granulocytes 0.1 0 - 0.4 10e9/L    Absolute Nucleated RBC 0.0    Basic metabolic panel   Result Value Ref Range    Sodium 138 133 - 144 mmol/L    Potassium 3.5 3.4 - 5.3 mmol/L    Chloride 104 94 - 109 mmol/L    Carbon Dioxide 26 20 - 32 mmol/L    Anion Gap 8 3 - 14 mmol/L    Glucose 173 (H) 70 - 99 mg/dL    Urea Nitrogen 15 7 - 30 mg/dL    Creatinine 0.99 0.66 - 1.25 mg/dL    GFR Estimate >90 >60 mL/min/[1.73_m2]    GFR Estimate If Black >90 >60 mL/min/[1.73_m2]    Calcium 8.9 8.5 - 10.1 mg/dL   UA reflex to Microscopic   Result Value Ref Range    Color Urine Straw     Appearance Urine Clear     Glucose Urine 150 (A) NEG^Negative mg/dL    Bilirubin Urine Negative NEG^Negative    Ketones Urine Negative NEG^Negative mg/dL    Specific Gravity Urine 1.008 1.003 - 1.035    Blood Urine Small (A) NEG^Negative    pH Urine 8.0 (H) 5.0 - 7.0 pH    Protein Albumin Urine Negative NEG^Negative mg/dL    Urobilinogen mg/dL 0.0 0.0 - 2.0 mg/dL    Nitrite Urine Negative NEG^Negative    Leukocyte Esterase Urine Negative NEG^Negative    Source Midstream Urine     RBC Urine 4 (H) 0 - 2 /HPF    WBC Urine 1 0 - 5 /HPF    Mucous Urine Present (A) NEG^Negative /LPF   Abd/pelvis CT - no contrast - Stone Protocol     Narrative    EXAM: CT ABDOMEN AND PELVIS WITHOUT CONTRAST  LOCATION: [facility]  DATE/TIME: 09/19/2020, 5:05 AM    INDICATION: Flank pain, stone disease suspected - left.  COMPARISON: None.  TECHNIQUE: CT scan of the abdomen and pelvis was performed without IV contrast. Multiplanar reformats were obtained. Dose reduction techniques were used.  CONTRAST: None.    FINDINGS:   LOWER CHEST: Mild dependent atelectasis at the lung bases.    HEPATOBILIARY: Diffuse fatty infiltration of the liver. There is a focal fatty sparing in the left lobe and adjacent to the gallbladder fossa.    PANCREAS: Normal.    SPLEEN: Calcified granulomas in the spleen.    ADRENAL GLANDS: Normal.    KIDNEYS/BLADDER: There is mild dilatation of the left renal collecting system caused by a 7 mm proximal ureteral stone. No other urinary stones on either side.    BOWEL: Normal.    LYMPH NODES: Normal.    VASCULATURE: Unremarkable.    PELVIC ORGANS: Normal.    MUSCULOSKELETAL: Normal.      Impression    IMPRESSION:   1.  Mild left hydronephrosis caused by a 7 mm proximal ureteral stone.  2.  Fatty infiltration of the liver.         Medications   ondansetron (ZOFRAN) injection 4 mg (4 mg Intravenous Given 9/19/20 0449)   HYDROmorphone (PF) (DILAUDID) injection 0.5 mg (0.5 mg Intravenous Given 9/19/20 0536)   ketorolac (TORADOL) injection 15 mg (15 mg Intravenous Given 9/19/20 0451)   0.9% sodium chloride BOLUS (1,000 mLs Intravenous New Bag 9/19/20 0448)   tamsulosin (FLOMAX) capsule 0.4 mg (0.4 mg Oral Given 9/19/20 0534)     5:27 AM Patient re-assessed: Pain had been improving but now worsened again after urination.  He reports shift of the pain from the left flank area wrapping around now more to the left anterior abdomen.  Pain currently rated 9/10.  Patient was given warm blankets.  Given the large size of the stone in the proximal location, Flomax was ordered to improve likelihood of passage.  Dilaudid ordered for pain control  given his ongoing severe pain with a proximal large stone, and the require admission for pain control and stone extraction    5:52 AM Patient re-assessed: Pain completely resolved.      Assessments & Plan (with Medical Decision Making)  45-year-old male presenting for evaluation of left-sided back pain.  Has had a waxing waning symptoms over the past few days at times moderate to severe and at times largely resolved.  Pain dramatically worsened tonight leading to evaluation tonight.  No trauma.  No overuse.  On exam patient has no spinal pain or tenderness.  Does have left flank tenderness.  Symptoms suggestive of possible ureterolithiasis versus muscle spasm.  Labs, urinalysis, CT obtained.  CT confirmed the presence of a proximal ureteral stone with some hydronephrosis.  Labs showed some slight blood without evidence of infection.  Normal kidney function.  Patient had some difficulty with pain control initially but pain subsequent resolved after Dilaudid and tamsulosin.  Discharged home with a plan for symptomatic control of new onset kidney stone.  Return precautions given if symptoms return and are not manageable with home medications.  If pain continues more than 1 week, recommended return to follow-up with urology     I have reviewed the nursing notes.    I have reviewed the findings, diagnosis, plan and need for follow up with the patient.       New Prescriptions    ACETAMINOPHEN (TYLENOL) 500 MG TABLET    Take 2 tablets (1,000 mg) by mouth every 8 hours as needed for fever or pain    IBUPROFEN (ADVIL/MOTRIN) 200 MG TABLET    Take 4 tablets (800 mg) by mouth every 8 hours as needed for mild pain    MORPHINE (MSIR) 15 MG IR TABLET    Take 0.5-1 tablets (7.5-15 mg) by mouth every 4 hours as needed for moderate to severe pain    TAMSULOSIN (FLOMAX) 0.4 MG CAPSULE    Take 1 capsule (0.4 mg) by mouth daily for 10 doses       Final diagnoses:   Ureterolithiasis       9/19/2020   [facility]  DEPARTMENT     Yun, Jose R Lua MD  09/19/20 0606

## 2020-09-19 NOTE — ED TRIAGE NOTES
Pt has been having back pain on and off for about 3 days, but tonight it woke him from sleep and caused him nausea and vomiting. On EMS arrival pain was 10/10 pt did have 10 of Fentanyl in route and 4 of zofran which took his pain down to about a 4 on arrival here pain is starting to come back.

## 2020-09-19 NOTE — ED AVS SNAPSHOT
[facility]  [address] [address]  Phone:  [phone]  Fax:  [phone]                                    Adalbreto Ramon   MRN: [id number]    Department:  [facility]   Date of Visit:  9/19/2020           After Visit Summary Signature Page    I have received my discharge instructions, and my questions have been answered. I have discussed any challenges I see with this plan with the nurse or doctor.    ..........................................................................................................................................  Patient/Patient Representative Signature      ..........................................................................................................................................  Patient Representative Print Name and Relationship to Patient    ..................................................               ................................................  Date                                   Time    ..........................................................................................................................................  Reviewed by Signature/Title    ...................................................              ..............................................  Date                                               Time          22EPIC Rev 08/18

## 2020-10-15 ENCOUNTER — APPOINTMENT (OUTPATIENT)
Dept: CT IMAGING | Facility: CLINIC | Age: 45
End: 2020-10-15
Attending: FAMILY MEDICINE
Payer: COMMERCIAL

## 2020-10-15 ENCOUNTER — HOSPITAL ENCOUNTER (EMERGENCY)
Facility: CLINIC | Age: 45
Discharge: HOME | End: 2020-10-15
Attending: FAMILY MEDICINE | Admitting: FAMILY MEDICINE
Payer: COMMERCIAL

## 2020-10-15 VITALS
HEART RATE: 84 BPM | RESPIRATION RATE: 18 BRPM | DIASTOLIC BLOOD PRESSURE: 108 MMHG | TEMPERATURE: 97.6 F | HEIGHT: 71 IN | BODY MASS INDEX: 33.6 KG/M2 | OXYGEN SATURATION: 98 % | WEIGHT: 240 LBS | SYSTOLIC BLOOD PRESSURE: 158 MMHG

## 2020-10-15 DIAGNOSIS — N20.1 URETERAL STONE: ICD-10-CM

## 2020-10-15 LAB
ALBUMIN UR-MCNC: 30 MG/DL
ANION GAP SERPL CALCULATED.3IONS-SCNC: 8 MMOL/L (ref 3–14)
APPEARANCE UR: CLEAR
BASOPHILS # BLD AUTO: 0.1 10E9/L (ref 0–0.2)
BASOPHILS NFR BLD AUTO: 0.3 %
BILIRUB UR QL STRIP: NEGATIVE
BUN SERPL-MCNC: 16 MG/DL (ref 7–30)
CALCIUM SERPL-MCNC: 9.6 MG/DL (ref 8.5–10.1)
CHLORIDE SERPL-SCNC: 105 MMOL/L (ref 94–109)
CO2 SERPL-SCNC: 23 MMOL/L (ref 20–32)
COLOR UR AUTO: YELLOW
CREAT SERPL-MCNC: 1.29 MG/DL (ref 0.66–1.25)
DIFFERENTIAL METHOD BLD: ABNORMAL
EOSINOPHIL # BLD AUTO: 0 10E9/L (ref 0–0.7)
EOSINOPHIL NFR BLD AUTO: 0.1 %
ERYTHROCYTE [DISTWIDTH] IN BLOOD BY AUTOMATED COUNT: 12.6 % (ref 10–15)
GFR SERPL CREATININE-BSD FRML MDRD: 66 ML/MIN/{1.73_M2}
GLUCOSE SERPL-MCNC: 131 MG/DL (ref 70–99)
GLUCOSE UR STRIP-MCNC: NEGATIVE MG/DL
HCT VFR BLD AUTO: 51.4 % (ref 40–53)
HGB BLD-MCNC: 17.5 G/DL (ref 13.3–17.7)
HGB UR QL STRIP: NEGATIVE
IMM GRANULOCYTES # BLD: 0.3 10E9/L (ref 0–0.4)
IMM GRANULOCYTES NFR BLD: 1.7 %
KETONES UR STRIP-MCNC: NEGATIVE MG/DL
LEUKOCYTE ESTERASE UR QL STRIP: NEGATIVE
LYMPHOCYTES # BLD AUTO: 1.4 10E9/L (ref 0.8–5.3)
LYMPHOCYTES NFR BLD AUTO: 8.9 %
MCH RBC QN AUTO: 29.1 PG (ref 26.5–33)
MCHC RBC AUTO-ENTMCNC: 34 G/DL (ref 31.5–36.5)
MCV RBC AUTO: 85 FL (ref 78–100)
MONOCYTES # BLD AUTO: 0.6 10E9/L (ref 0–1.3)
MONOCYTES NFR BLD AUTO: 3.9 %
MUCOUS THREADS #/AREA URNS LPF: PRESENT /LPF
NEUTROPHILS # BLD AUTO: 13.2 10E9/L (ref 1.6–8.3)
NEUTROPHILS NFR BLD AUTO: 85.1 %
NITRATE UR QL: NEGATIVE
NRBC # BLD AUTO: 0 10*3/UL
NRBC BLD AUTO-RTO: 0 /100
PH UR STRIP: 8 PH (ref 5–7)
PLATELET # BLD AUTO: 298 10E9/L (ref 150–450)
POTASSIUM SERPL-SCNC: 4.3 MMOL/L (ref 3.4–5.3)
RBC # BLD AUTO: 6.02 10E12/L (ref 4.4–5.9)
RBC #/AREA URNS AUTO: 4 /HPF (ref 0–2)
SODIUM SERPL-SCNC: 136 MMOL/L (ref 133–144)
SOURCE: ABNORMAL
SP GR UR STRIP: 1.02 (ref 1–1.03)
SQUAMOUS #/AREA URNS AUTO: 1 /HPF (ref 0–1)
UROBILINOGEN UR STRIP-MCNC: 2 MG/DL (ref 0–2)
WBC # BLD AUTO: 15.6 10E9/L (ref 4–11)
WBC #/AREA URNS AUTO: 0 /HPF (ref 0–5)

## 2020-10-15 PROCEDURE — 85025 COMPLETE CBC W/AUTO DIFF WBC: CPT | Performed by: FAMILY MEDICINE

## 2020-10-15 PROCEDURE — 81001 URINALYSIS AUTO W/SCOPE: CPT | Performed by: FAMILY MEDICINE

## 2020-10-15 PROCEDURE — 96361 HYDRATE IV INFUSION ADD-ON: CPT | Performed by: FAMILY MEDICINE

## 2020-10-15 PROCEDURE — 99284 EMERGENCY DEPT VISIT MOD MDM: CPT | Performed by: FAMILY MEDICINE

## 2020-10-15 PROCEDURE — 74176 CT ABD & PELVIS W/O CONTRAST: CPT

## 2020-10-15 PROCEDURE — 250N000011 HC RX IP 250 OP 636: Performed by: FAMILY MEDICINE

## 2020-10-15 PROCEDURE — 99285 EMERGENCY DEPT VISIT HI MDM: CPT | Mod: 25 | Performed by: FAMILY MEDICINE

## 2020-10-15 PROCEDURE — 96374 THER/PROPH/DIAG INJ IV PUSH: CPT | Performed by: FAMILY MEDICINE

## 2020-10-15 PROCEDURE — 96376 TX/PRO/DX INJ SAME DRUG ADON: CPT | Performed by: FAMILY MEDICINE

## 2020-10-15 PROCEDURE — 80048 BASIC METABOLIC PNL TOTAL CA: CPT | Performed by: FAMILY MEDICINE

## 2020-10-15 PROCEDURE — 96375 TX/PRO/DX INJ NEW DRUG ADDON: CPT | Performed by: FAMILY MEDICINE

## 2020-10-15 PROCEDURE — 258N000003 HC RX IP 258 OP 636: Performed by: FAMILY MEDICINE

## 2020-10-15 RX ORDER — KETOROLAC TROMETHAMINE 15 MG/ML
15 INJECTION, SOLUTION INTRAMUSCULAR; INTRAVENOUS ONCE
Status: COMPLETED | OUTPATIENT
Start: 2020-10-15 | End: 2020-10-15

## 2020-10-15 RX ORDER — MORPHINE SULFATE 15 MG/1
7.5-15 TABLET ORAL EVERY 4 HOURS PRN
Qty: 6 TABLET | Refills: 0 | Status: SHIPPED | OUTPATIENT
Start: 2020-10-15

## 2020-10-15 RX ORDER — OXYCODONE HYDROCHLORIDE 5 MG/1
5 TABLET ORAL EVERY 4 HOURS PRN
Status: DISCONTINUED | OUTPATIENT
Start: 2020-10-15 | End: 2020-10-15 | Stop reason: HOSPADM

## 2020-10-15 RX ORDER — HYDROMORPHONE HYDROCHLORIDE 1 MG/ML
0.5 INJECTION, SOLUTION INTRAMUSCULAR; INTRAVENOUS; SUBCUTANEOUS
Status: DISCONTINUED | OUTPATIENT
Start: 2020-10-15 | End: 2020-10-15 | Stop reason: HOSPADM

## 2020-10-15 RX ORDER — SODIUM CHLORIDE 9 MG/ML
INJECTION, SOLUTION INTRAVENOUS CONTINUOUS
Status: DISCONTINUED | OUTPATIENT
Start: 2020-10-15 | End: 2020-10-15 | Stop reason: HOSPADM

## 2020-10-15 RX ORDER — ONDANSETRON 4 MG/1
4 TABLET, ORALLY DISINTEGRATING ORAL EVERY 8 HOURS PRN
Qty: 10 TABLET | Refills: 0 | Status: SHIPPED | OUTPATIENT
Start: 2020-10-15 | End: 2020-10-18

## 2020-10-15 RX ORDER — SODIUM CHLORIDE 9 MG/ML
1000 INJECTION, SOLUTION INTRAVENOUS CONTINUOUS
Status: DISCONTINUED | OUTPATIENT
Start: 2020-10-15 | End: 2020-10-15 | Stop reason: HOSPADM

## 2020-10-15 RX ORDER — TAMSULOSIN HYDROCHLORIDE 0.4 MG/1
0.4 CAPSULE ORAL DAILY
Qty: 14 CAPSULE | Refills: 0 | Status: SHIPPED | OUTPATIENT
Start: 2020-10-15 | End: 2020-10-29

## 2020-10-15 RX ORDER — ONDANSETRON 2 MG/ML
4 INJECTION INTRAMUSCULAR; INTRAVENOUS EVERY 30 MIN PRN
Status: DISCONTINUED | OUTPATIENT
Start: 2020-10-15 | End: 2020-10-15 | Stop reason: HOSPADM

## 2020-10-15 RX ADMIN — SODIUM CHLORIDE 1000 ML: 9 INJECTION, SOLUTION INTRAVENOUS at 16:08

## 2020-10-15 RX ADMIN — KETOROLAC TROMETHAMINE 15 MG: 15 INJECTION, SOLUTION INTRAMUSCULAR; INTRAVENOUS at 15:42

## 2020-10-15 RX ADMIN — KETOROLAC TROMETHAMINE 15 MG: 15 INJECTION, SOLUTION INTRAMUSCULAR; INTRAVENOUS at 16:08

## 2020-10-15 RX ADMIN — ONDANSETRON 4 MG: 2 INJECTION INTRAMUSCULAR; INTRAVENOUS at 15:12

## 2020-10-15 RX ADMIN — SODIUM CHLORIDE 1000 ML: 9 INJECTION, SOLUTION INTRAVENOUS at 15:11

## 2020-10-15 ASSESSMENT — ENCOUNTER SYMPTOMS
WHEEZING: 0
COUGH: 0
CONSTIPATION: 0
BLOOD IN STOOL: 0
DIAPHORESIS: 0
VOMITING: 0
SINUS PRESSURE: 0
FREQUENCY: 1
HEADACHES: 0
NAUSEA: 0
DIARRHEA: 0
FLANK PAIN: 1
FEVER: 0
ABDOMINAL PAIN: 1
SORE THROAT: 0
SHORTNESS OF BREATH: 0
CHILLS: 0
DYSURIA: 0
PALPITATIONS: 0

## 2020-10-15 ASSESSMENT — MIFFLIN-ST. JEOR: SCORE: 1982.82

## 2020-10-15 NOTE — DISCHARGE INSTRUCTIONS
ICD-10-CM    1. Ureteral stone  N20.1     return immed for signs infection - fever, burning with urination. take ibuprofen 600 mg every 6 hoiurs schedfuiled. take tylenol 1000 mg every 6 hours scheduled.  zofran as neede for nausea/vomiting.  morphine IFR for breakthrough pain.

## 2020-10-15 NOTE — ED PROVIDER NOTES
History     Chief Complaint   Patient presents with     Flank Pain     left side flank and abd pain with radiation to groin, urinary urgency and frerquency voiding small amounts     Abdominal Pain     HPI  Adalberto Ramon is a 45 year old male who presents with left-sided flank pain and abdominal pain left side after being diagnosed 3 weeks ago for proximal ureteral stone.  Has been experiencing since 4 AM constant pain in this region with urinary frequency.  No dysuria.  No blood seen.  No fever.  No associated vomiting.  There is nausea.  Took Advil this morning.  Has not yet passed a stone that he is aware of.      Allergies:  No Known Allergies    Problem List:    Patient Active Problem List    Diagnosis Date Noted     CARDIOVASCULAR SCREENING; LDL GOAL LESS THAN 160 11/20/2012     Priority: Medium     Family history of prostate cancer 11/13/2012     Priority: Medium     Father       Esophageal reflux 11/13/2012     Priority: Medium     Snoring 11/13/2012     Priority: Medium     Family history of bladder cancer 11/13/2012     Priority: Medium        Past Medical History:    No past medical history on file.    Past Surgical History:    No past surgical history on file.    Family History:    Family History   Problem Relation Age of Onset     Cancer Father         bladder cancer       Social History:  Marital Status:   [2]  Social History     Tobacco Use     Smoking status: Never Smoker     Smokeless tobacco: Never Used   Substance Use Topics     Alcohol use: Yes     Comment: rare- once or twice a year     Drug use: No        Medications:         calcium carbonate (TUMS) 500 MG chewable tablet       Capsaicin 0.1 % CREA       cyclobenzaprine (FLEXERIL) 10 MG tablet       morphine (MSIR) 15 MG IR tablet          Review of Systems   Constitutional: Negative for chills, diaphoresis and fever.   HENT: Negative for ear pain, sinus pressure and sore throat.    Eyes: Negative for visual disturbance.  "  Respiratory: Negative for cough, shortness of breath and wheezing.    Cardiovascular: Negative for chest pain and palpitations.   Gastrointestinal: Positive for abdominal pain. Negative for blood in stool, constipation, diarrhea, nausea and vomiting.   Genitourinary: Positive for flank pain and frequency. Negative for dysuria and urgency.   Skin: Negative for rash.   Neurological: Negative for headaches.   All other systems reviewed and are negative.      Physical Exam   BP: (!) 179/108  Pulse: 81  Temp: 97.6  F (36.4  C)  Resp: 18  Height: 179.1 cm (5' 10.5\")  Weight: 108.9 kg (240 lb)  SpO2: 95 %      Physical Exam  Constitutional:       General: He is in acute distress.      Appearance: He is not diaphoretic.   HENT:      Head: Atraumatic.   Eyes:      Conjunctiva/sclera: Conjunctivae normal.   Neck:      Musculoskeletal: Neck supple.   Cardiovascular:      Rate and Rhythm: Normal rate and regular rhythm.      Heart sounds: No murmur.   Pulmonary:      Effort: Pulmonary effort is normal. No respiratory distress.      Breath sounds: No stridor. No wheezing or rhonchi.   Abdominal:      General: Bowel sounds are normal.      Palpations: Abdomen is soft.      Tenderness: There is abdominal tenderness in the left lower quadrant. There is left CVA tenderness. There is no right CVA tenderness, guarding or rebound.   Skin:     Findings: No rash.   Neurological:      Mental Status: He is alert.           ED Course        Procedures               Critical Care time:  none               Results for orders placed or performed during the hospital encounter of 10/15/20 (from the past 24 hour(s))   UA with Microscopic   Result Value Ref Range    Color Urine Yellow     Appearance Urine Clear     Glucose Urine Negative NEG^Negative mg/dL    Bilirubin Urine Negative NEG^Negative    Ketones Urine Negative NEG^Negative mg/dL    Specific Gravity Urine 1.021 1.003 - 1.035    Blood Urine Negative NEG^Negative    pH Urine 8.0 (H) 5.0 " - 7.0 pH    Protein Albumin Urine 30 (A) NEG^Negative mg/dL    Urobilinogen mg/dL 2.0 0.0 - 2.0 mg/dL    Nitrite Urine Negative NEG^Negative    Leukocyte Esterase Urine Negative NEG^Negative    Source Unspecified Urine     WBC Urine 0 0 - 5 /HPF    RBC Urine 4 (H) 0 - 2 /HPF    Squamous Epithelial /HPF Urine 1 0 - 1 /HPF    Mucous Urine Present (A) NEG^Negative /LPF   CBC with platelets differential   Result Value Ref Range    WBC 15.6 (H) 4.0 - 11.0 10e9/L    RBC Count 6.02 (H) 4.4 - 5.9 10e12/L    Hemoglobin 17.5 13.3 - 17.7 g/dL    Hematocrit 51.4 40.0 - 53.0 %    MCV 85 78 - 100 fl    MCH 29.1 26.5 - 33.0 pg    MCHC 34.0 31.5 - 36.5 g/dL    RDW 12.6 10.0 - 15.0 %    Platelet Count 298 150 - 450 10e9/L    Diff Method Automated Method     % Neutrophils 85.1 %    % Lymphocytes 8.9 %    % Monocytes 3.9 %    % Eosinophils 0.1 %    % Basophils 0.3 %    % Immature Granulocytes 1.7 %    Nucleated RBCs 0 0 /100    Absolute Neutrophil 13.2 (H) 1.6 - 8.3 10e9/L    Absolute Lymphocytes 1.4 0.8 - 5.3 10e9/L    Absolute Monocytes 0.6 0.0 - 1.3 10e9/L    Absolute Eosinophils 0.0 0.0 - 0.7 10e9/L    Absolute Basophils 0.1 0.0 - 0.2 10e9/L    Abs Immature Granulocytes 0.3 0 - 0.4 10e9/L    Absolute Nucleated RBC 0.0    Basic metabolic panel   Result Value Ref Range    Sodium 136 133 - 144 mmol/L    Potassium 4.3 3.4 - 5.3 mmol/L    Chloride 105 94 - 109 mmol/L    Carbon Dioxide 23 20 - 32 mmol/L    Anion Gap 8 3 - 14 mmol/L    Glucose 131 (H) 70 - 99 mg/dL    Urea Nitrogen 16 7 - 30 mg/dL    Creatinine 1.29 (H) 0.66 - 1.25 mg/dL    GFR Estimate 66 >60 mL/min/[1.73_m2]    GFR Estimate If Black 76 >60 mL/min/[1.73_m2]    Calcium 9.6 8.5 - 10.1 mg/dL   Abd/pelvis CT - no contrast - Stone Protocol    Narrative    CT ABDOMEN AND PELVIS WITHOUT CONTRAST 10/15/2020 3:52 PM    CLINICAL HISTORY: Left flank pain.    TECHNIQUE: CT scan of the abdomen and pelvis was performed without IV  contrast. Multiplanar reformats were obtained. Dose  reduction  techniques were used.    CONTRAST: None.    COMPARISON: CT of the abdomen and pelvis performed 9/19/2020.    FINDINGS:   LOWER CHEST: Scattered calcified granulomas in the right lower lobe of  the lung. A few scattered mediastinal and right hilar lymph nodes are  noted.    HEPATOBILIARY: Diffuse fatty infiltration of the liver, similar to the  previous exam.    PANCREAS: Normal.    SPLEEN: Scattered small calcified granulomas in the spleen.    ADRENAL GLANDS: Normal.    KIDNEYS/BLADDER: There is an obstructing stone in the distal left  ureter, measuring 0.6 cm, and causing mild left hydronephrosis and  perinephric stranding. This stone has migrated inferiorly since the  previous exam, and associated left hydronephrosis is not significantly  changed. No other urinary calculi are identified. No hydronephrosis on  the right.    BOWEL: No bowel obstruction. No convincing evidence for colitis or  diverticulitis. Unremarkable appendix.    PELVIC ORGANS: Unremarkable.    LYMPH NODES: No enlarged lymph nodes are identified in the abdomen or  pelvis.    VASCULATURE: Unremarkable.    ADDITIONAL FINDINGS: None.    MUSCULOSKELETAL: Unremarkable.      Impression    IMPRESSION:   1.  Obstructing 0.6 stone in the distal left ureter causes mild left  hydronephrosis.  2.  Diffuse fatty infiltration of the liver.    YARY DONAHUE MD       Medications   0.9% sodium chloride BOLUS (1,000 mLs Intravenous New Bag 10/15/20 1511)     Followed by   sodium chloride 0.9% infusion (has no administration in time range)   ondansetron (ZOFRAN) injection 4 mg (4 mg Intravenous Given 10/15/20 1512)   0.9% sodium chloride BOLUS (has no administration in time range)   sodium chloride 0.9% infusion (has no administration in time range)   ketorolac (TORADOL) injection 15 mg (has no administration in time range)       Assessments & Plan (with Medical Decision Making)     MDFM: Adalberto GRIMES Ramon is a 45 year old male who presents with left  lower quadrant abdominal pain and left flank pain after having been seen on September 19 for proximal ureteral stone and no history of prior ureteral stone prior to that time.  UA was clear.  This was other than red cells seen.  Normal renal function.  Have millimeter ureteral stone seen.  This was proximal.  Has been on Flomax.    Arrives here today with onset this morning 4 AM of severe pain.  Findings are consistent likely with ureteral stone that is likely now at the [facility].  Will perform repeat CT.  Obtain urinalysis.    Findings consistent with ureteral stone at the [facility] we discussed management at home with precautions for return.    [place] monitoring program reviewed.  His only narcotic prescription was the prescription given by Dr. Yun on September 19.        I have reviewed the nursing notes.    I have reviewed the findings, diagnosis, plan and need for follow up with the patient.       New Prescriptions    No medications on file       Final diagnoses:   Ureteral stone - return immed for signs infection - fever, burning with urination. take ibuprofen 600 mg every 6 hoiurs schedfuiled. take tylenol 1000 mg every 6 hours scheduled.  zofran as neede for nausea/vomiting.  morphine IFR for breakthrough pain.       10/15/2020   [facility] EMERGENCY DEPT     Shiva Gan MD  10/15/20 [id number]

## 2020-10-15 NOTE — LETTER
October 15, 2020      To Whom It May Concern:      Adalberto Ramon was seen in our Emergency Department today, 10/15/20.  I expect his condition to improve over the next 5 days.  He may return to work/school when improved.    Sincerely,        Shiva Gan MD

## 2020-10-15 NOTE — ED AVS SNAPSHOT
[facility] Emergency Dept  [address] [address]  Phone: [phone]  Fax: [phone]                                    Adalberto Ramon   MRN: [id number]    Department: [facility] Emergency Dept   Date of Visit: 10/15/2020           After Visit Summary Signature Page    I have received my discharge instructions, and my questions have been answered. I have discussed any challenges I see with this plan with the nurse or doctor.    ..........................................................................................................................................  Patient/Patient Representative Signature      ..........................................................................................................................................  Patient Representative Print Name and Relationship to Patient    ..................................................               ................................................  Date                                   Time    ..........................................................................................................................................  Reviewed by Signature/Title    ...................................................              ..............................................  Date                                               Time          22EPIC Rev 08/18